# Patient Record
Sex: FEMALE | Race: WHITE
[De-identification: names, ages, dates, MRNs, and addresses within clinical notes are randomized per-mention and may not be internally consistent; named-entity substitution may affect disease eponyms.]

---

## 2018-03-13 ENCOUNTER — HOSPITAL ENCOUNTER (OUTPATIENT)
Dept: HOSPITAL 77 - KA.SDS | Age: 68
Discharge: HOME | End: 2018-03-13
Attending: OPHTHALMOLOGY
Payer: MEDICARE

## 2018-03-13 DIAGNOSIS — F41.8: ICD-10-CM

## 2018-03-13 DIAGNOSIS — Z79.899: ICD-10-CM

## 2018-03-13 DIAGNOSIS — E55.9: ICD-10-CM

## 2018-03-13 DIAGNOSIS — Z79.82: ICD-10-CM

## 2018-03-13 DIAGNOSIS — H25.812: Primary | ICD-10-CM

## 2018-03-13 DIAGNOSIS — E03.9: ICD-10-CM

## 2018-03-13 DIAGNOSIS — E78.5: ICD-10-CM

## 2018-03-13 PROCEDURE — 66984 XCAPSL CTRC RMVL W/O ECP: CPT

## 2018-03-13 RX ADMIN — PHENYLEPHRINE HYDROCHLORIDE SCH DROP: 100 SOLUTION/ DROPS OPHTHALMIC at 13:20

## 2018-03-13 RX ADMIN — PHENYLEPHRINE HYDROCHLORIDE SCH DROP: 100 SOLUTION/ DROPS OPHTHALMIC at 13:05

## 2018-03-13 RX ADMIN — PHENYLEPHRINE HYDROCHLORIDE SCH DROP: 100 SOLUTION/ DROPS OPHTHALMIC at 13:36

## 2018-03-13 RX ADMIN — CYCLOPENTOLATE HYDROCHLORIDE SCH DROP: 10 SOLUTION/ DROPS OPHTHALMIC at 13:25

## 2018-03-13 RX ADMIN — CYCLOPENTOLATE HYDROCHLORIDE SCH DROP: 10 SOLUTION/ DROPS OPHTHALMIC at 12:55

## 2018-03-13 RX ADMIN — CYCLOPENTOLATE HYDROCHLORIDE SCH DROP: 10 SOLUTION/ DROPS OPHTHALMIC at 13:10

## 2018-03-13 NOTE — OR
DATE OF SURGERY:  03/13/2018

 

SURGEON:  Uday Alonzo MD

 

PREOPERATIVE DIAGNOSIS:  Cataract, left eye.

 

POSTOPERATIVE DIAGNOSIS:  Cataract, left eye.

 

OPERATION PERFORMED:  Phacoemulsification with posterior chamber lens insertion,

left eye.

 

HISTORY:  The patient has noted increasing blurred vision for left eye

particularly as she tries to read.  She has a vision of 20/50 +2 to the left.

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

related combined cataract is present.

 

FINDINGS:  The patient was taken to the operating room where appropriate

anesthesia, sedation and monitoring were provided.  A retrobulbar block was

given on the left side.  The eye was massaged and was found to be appropriately

soft.  The eye and eyelids were then prepped and draped in the usual sterile

manner.  A lid speculum was placed.  A micro sharp blade was used to enter the

anterior chamber inside the limbus inferior-temporally.  Xylocaine was irrigated

into the eye at this site. Healon was irrigated into the eye through this site.

Then using a 2.85 mm corneal blade an entry was made into the anterior chamber

just inside the limbus temporally.  Healon was again irrigated into the eye.

Then using a cystitome, the anterior capsulorrhexis was created.  The lens

nucleus was hydrodissected using a 27 gauge cannula and balanced salt solution.

The phacoemulsification unit was introduced through the temporal site and the

Jacques spatula through the inferior temporal site.  In so doing, the lens

nucleus was phacoemulsified.  The cortical fragments of the lens were removed

using the irrigation aspiration unit.  The posterior capsule was polished.

Healon was irrigated into the eye.  The posterior chamber lens was inserted and

rotated into position inside the capsular bag.  The Healon was irrigated out of

the eye.  Miostat was irrigated into the eye and the pupil rounded nicely.  A

single interrupted 10-0 Nylon suture was placed through the temporal corneal

incision site.  Balanced salt solution was irrigated into the eye.  The wound

was tested and found to be tight.  Maxitrol ointment was placed into the

patient's left eye.  The eyelids were closed and an eye patch and valiente shield

were placed.  The patient left the operating room in good condition.

 

DD:  03/13/2018 14:35:48

DT:  03/13/2018 20:44:27

Job #:  [id number]

## 2018-04-03 ENCOUNTER — HOSPITAL ENCOUNTER (OUTPATIENT)
Dept: HOSPITAL 77 - KA.SDS | Age: 68
Discharge: HOME | End: 2018-04-03
Attending: OPHTHALMOLOGY
Payer: MEDICARE

## 2018-04-03 DIAGNOSIS — I49.3: ICD-10-CM

## 2018-04-03 DIAGNOSIS — Z79.899: ICD-10-CM

## 2018-04-03 DIAGNOSIS — E03.9: ICD-10-CM

## 2018-04-03 DIAGNOSIS — E53.9: ICD-10-CM

## 2018-04-03 DIAGNOSIS — H26.9: Primary | ICD-10-CM

## 2018-04-03 DIAGNOSIS — Z79.82: ICD-10-CM

## 2018-04-03 DIAGNOSIS — F41.8: ICD-10-CM

## 2018-04-03 DIAGNOSIS — E78.5: ICD-10-CM

## 2018-04-03 PROCEDURE — C1780 LENS, INTRAOCULAR (NEW TECH): HCPCS

## 2018-04-03 RX ADMIN — PHENYLEPHRINE HYDROCHLORIDE SCH DROP: 100 SOLUTION/ DROPS OPHTHALMIC at 10:19

## 2018-04-03 RX ADMIN — PHENYLEPHRINE HYDROCHLORIDE SCH DROP: 100 SOLUTION/ DROPS OPHTHALMIC at 10:03

## 2018-04-03 RX ADMIN — CYCLOPENTOLATE HYDROCHLORIDE SCH DROP: 10 SOLUTION/ DROPS OPHTHALMIC at 10:09

## 2018-04-03 RX ADMIN — CYCLOPENTOLATE HYDROCHLORIDE SCH DROP: 10 SOLUTION/ DROPS OPHTHALMIC at 10:24

## 2018-04-03 RX ADMIN — PHENYLEPHRINE HYDROCHLORIDE SCH DROP: 100 SOLUTION/ DROPS OPHTHALMIC at 10:34

## 2018-04-03 RX ADMIN — CYCLOPENTOLATE HYDROCHLORIDE SCH DROP: 10 SOLUTION/ DROPS OPHTHALMIC at 09:54

## 2018-04-04 NOTE — OR
DATE OF SURGERY:  04/03/2018

 

SURGEON:  Uday Alonzo MD

 

PREOPERATIVE DIAGNOSIS:  Cataract, right eye.

 

POSTOPERATIVE DIAGNOSIS:  Cataract, right eye.

 

OPERATION PERFORMED:  Phacoemulsification with posterior chamber lens insertion,

right eye.

 

HISTORY:  The patient presents at this time with an increasing amount of

difficulty seeing to read.  Her vision in the right eye is 20/50.  The right eye

has a 3+ nuclear sclerosis and 3+ cortical change of the lens.  She has a

combined cataract and a cataract of aging.

 

FINDINGS:  The patient was taken to the operating room where appropriate

anesthesia, sedation and monitoring were provided.  A retrobulbar block was

given on the right side.  The eye was massaged and was found to be appropriately

soft.  The eye and eyelids were then prepped and draped in the usual sterile

manner.  A lid speculum was placed.  A micro sharp blade was used to enter the

anterior chamber inside the limbus superior-temporally.  Xylocaine was irrigated

into the eye at this site. Healon was irrigated into the eye through this site.

Then using a 2.85 mm corneal blade an entry was made into the anterior chamber

just inside the limbus temporally.  Healon was again irrigated into the eye.

Then using a cystitome, the anterior capsulorrhexis was created.  The lens

nucleus was hydrodissected using a 27 gauge cannula and balanced salt solution.

The phacoemulsification unit was introduced through the temporal site and the

Jacques spatula through the superior temporal site.  In so doing, the lens

nucleus was phacoemulsified.  The cortical fragments of the lens were removed

using the irrigation aspiration unit.  The posterior capsule was polished.

Healon was irrigated into the eye.  The posterior chamber lens was inserted and

rotated into position inside the capsular bag.  The Healon was irrigated out of

the eye.  Miostat was irrigated into the eye and the pupil rounded nicely.  Two

interrupted 10-0 Nylon sutures were placed through the temporal corneal incision

site.  Balanced salt solution was irrigated into the eye.  The wound was tested

and found to be tight.  Maxitrol ointment was placed into the patient's right

eye.  The eyelids were closed and an eye patch and valiente shield were placed.  The

patient left the operating room in good condition.

 

DD:  04/03/2018 11:45:35

DT:  04/03/2018 13:49:53

Job #:  [id number]

## 2018-10-08 ENCOUNTER — HOSPITAL ENCOUNTER (OUTPATIENT)
Dept: HOSPITAL 77 - KA.SDS | Age: 68
Discharge: HOME | End: 2018-10-08
Attending: FAMILY MEDICINE
Payer: MEDICARE

## 2018-10-08 DIAGNOSIS — E78.5: ICD-10-CM

## 2018-10-08 DIAGNOSIS — E53.8: ICD-10-CM

## 2018-10-08 DIAGNOSIS — K57.30: ICD-10-CM

## 2018-10-08 DIAGNOSIS — Z12.11: Primary | ICD-10-CM

## 2018-10-08 DIAGNOSIS — E03.9: ICD-10-CM

## 2018-10-08 DIAGNOSIS — E55.9: ICD-10-CM

## 2018-10-08 DIAGNOSIS — Z80.0: ICD-10-CM

## 2018-10-08 DIAGNOSIS — Z79.82: ICD-10-CM

## 2018-10-08 DIAGNOSIS — Z79.899: ICD-10-CM

## 2018-10-08 DIAGNOSIS — F33.1: ICD-10-CM

## 2018-10-08 NOTE — PCM.OPNOTE
- General Post-Op/Procedure Note


Date of Surgery/Procedure: 10/08/18


Operative Procedure(s): Colonoscopy.


Anesthesia Technique: MAC


Primary Surgeon: Jorge Carrillo


Complications: None


Condition: Good


Free Text/Narrative:: 


INFORMED CONSENT:  Patient is here today for elective colonoscopy.  All aspects 

of this procedure have been discussed with the patient.  All possible 

complications also, including possibility of perforation, infection, pain, 

bleeding and unknown complications.  In the event of perforation patient may 

need to have abdominal exploration, colon resection, colostomy and even death 

was discussed.  Anesthetic complications were handled by anesthesia department.

  The patient understands fully well.  Patient did not have any further 

questions for me at the end of my interview.  The patient wishes for me to 

proceed.





PREOPERATIVE DIAGNOSIS/INDICATIONS:  [screening, strong family history of colon 

carcinoma, sister and brother.]





POSTOPERATIVE DIAGNOSIS:  [Normal] except for market diverticulosis of the 

sigmoid colon





INSTRUMENT USED: Olympus videocolonoscope.    





ASA CLASSIFICATION: []      





ANESTHESIA:  Continuous EKG, oximetry and intermittent blood pressure and 

respiratory monitoring were performed throughout the procedure.  IV Versed and 

Fentanyl were administered. 





PROCEDURE PERFORMED:   Colonoscopy


POSITIONS OF PATIENT: Left lateral.


RECTUM:  Normal.


SIGMOID COLON:  Multiple large diverticula were seen. There was one spastic 

segment.No intrinsic lesions. 


DESCENDING COLON:  Normal. 


SPLENIC FLEXURE:  Normal. 


TRANSVERSE COLON: Normal. 


HEPATIC FLEXURE:  Normal. 


ASCENDING COLON: Normal. 


CECUM:  Normal. 


ILEOCECAL VALVE:  Normal. 


BIOPSY:  None. 


TOLERANCE:  Excellent. 


COMPLICATIONS:  None.

## 2019-08-15 NOTE — PCM.DCSUM1
**Discharge Summary





- Hospital Course


Diagnosis: Stroke: No





- Discharge Data


Discharge Date: 08/15/19


Discharge Disposition: Home, Self-Care 01


Condition: Good





- Discharge Plan


*PRESCRIPTION DRUG MONITORING PROGRAM REVIEWED*: No


*COPY OF PRESCRIPTION DRUG MONITORING REPORT IN PATIENT LAUREN: No


Home Medications: 


 Home Meds





Levothyroxine [Synthroid] 100 mcg PO Q48H 06/15/14 [History]


atorvaSTATin [Lipitor] 20 mg PO BEDTIME 06/15/14 [History]


Cyanocobalamin (Vitamin B-12) [Cyanocobalamin Injection] 1,000 mcg IJ 

ASDIRECTED 03/02/18 [History]


Levothyroxine 112 mcg PO Q48H 03/02/18 [History]


Ergocalciferol (Vitamin D2) [Ergocal] 5,000 unit PO DAILY 10/05/18 [History]


Ibuprofen 200 - 600 mg PO Q4H PRN 10/05/18 [History]


Latanoprost 1 drop EYEBOTH BEDTIME 10/05/18 [History]


cycloSPORINE [Restasis] 1 drop EYEBOTH BID 10/05/18 [History]


Citalopram [Citalopram HBr] 20 mg PO DAILY 08/14/19 [History]


Flaxseed Oil [Flaxseed] 1,000 mg PO DAILY 08/14/19 [History]








Referrals: 


Lucy Gonzalez MD [Physician] - 





- Discharge Summary/Plan Comment


DC Time >30 min.: Yes


Discharge Summary/Plan Comment: 


Final diagnosis


IVANA, clinical,


Bradycardia, 1st degree AV block, new onset.


suspect HTN? 


Mixed hyperlipidemia


Obesity


Acquired hypothyroidism, low TSH


Excessive Cerumen, right ear


Depression with anxiety, was on dual SSRI, Zolot DC'd.








History:  Reviewed H&P by admitted provider. 


Patient came to clinic and was admitted by Liliane Carlos NP due to 

dizziness. Patient reported the dizziness started upon awakening that am. 

Attempted to get OOB and had to lay back down d/t dizziness. Eventually got 

back up very slowly and continued to feel dizzy. Described dizziness as an "off 

balance" or disequilibrium. Patient quite adament that her sx were not vertigo 

as she notes she has had this in the past and described it quite different. 

Admitted to nausea, Had no recent illness, no chest pain, SOB, palpitations, or 

headace or "eye twitching". EKG on admission noting marked sinus bradycardia at 

47 bpm with new 1st degree AV block, possible anterior infarct, poor R wave 

progression. EKG (5/2019) which noted sinus bradycardia at 56 bpm, possible 

anterior infarct, poor R wave progression. Patient has had work-up with nuclear 

med stress test (4/2018) which was negative for ischemia. Bedside glucose 124. 

CBC unremarkable except mildly low WBC at 3.7. CMP unremarkable. Troponin <

0.04. Magnesium nomal,  She did have slightly elevated BP on admission 146/

86.she does have hypothyroidism with low normal TSH level, on replacement 

therapy.  Upon admission she was given NS 1 liter IV bolus x 1, then NS at 75 mL

/hr.  she did had a was successfully irrigated prior to discharge. 








Holter Monitor 2018


--Rhythm was predominantly sinus rhythm with frequent VEs.


--Tachycardia was present for 2% of the tracing. 


--Maximum  bpm 


--Bradycardia was present for 34% of the tracing. Minimum heart rate of 37 


--Frequent VEs (10% of tracing) were seen as isolated, couplets, bigeminal 

cycles, trigeminal cycles, quadrigeminal cycles, and 1 run (longest and: 

fastest run of 4 beats at 111 bpm 








Hospital course


Hospital course went well.  The patient's dizziness symptoms completely 

resolved.  nurses did repobradycardia (30/40's) with periods of apneic while 

patient was sleeping.  Spouse and patient confirm loud snoring, gasping, and 

likely sounds of interruptions in breathing.  she had normal orthostatic, 

normal neurological eval and normal nurisng bedside neuro-checks.  Upon 

admission she was given NS 1 liter IV bolus x 1, then NS at 75 mL/hr. Her right 

ear had excessive cerumen buildup and was successfully irrigated prior to 

discharge. 





Neurological exam:  Normal with Normal Gait


Epsworth Sleepiness Scale:  9/24 (borderline abnormal)


Neck Circumference:  14 in





Medication changes/adjustments upon Discharge:


--Zoloft DC'd upon admission


--May need thyroid med adjumtments 





disposition overall plan


Patient will be discharged from the hospital. she will follow-up with Dr. Amie Gallego, [facility].  I discussed with her about possible IVANA and could be 

contributing to arrhythmias/bradycardia and the need for sleep study. 





Orders placed in EPIC 


--ECHO


--Sleep medicine





- General Info


Functional Status: Reports: Pain Controlled, Tolerating Diet, Ambulating, 

Urinating.  Denies: New Symptoms





- Review of Systems


General: Reports: No Symptoms


HEENT: Denies: Headaches, Sinus Congestion, Sore Throat, Visual Changes


Pulmonary: Reports: No Symptoms


Cardiovascular: Reports: No Symptoms


Gastrointestinal: Reports: No Symptoms


Genitourinary: Reports: No Symptoms


Musculoskeletal: Reports: No Symptoms


Skin: Reports: No Symptoms


Neurological: Denies: Confusion, Dizziness, Numbness, Paresthesia, Seizure, 

Syncope, Trouble Speaking, Difficulty Walking, Weakness, Gait Disturbance


Psychiatric: Reports: No Symptoms





- Patient Data


Vitals - Most Recent: 


 Last Vital Signs











Temp  99.3 F   08/15/19 06:20


 


Pulse  60   08/15/19 06:20


 


Resp  18   08/15/19 06:20


 


BP  132/65   08/15/19 06:20


 


Pulse Ox  98   08/15/19 07:55








 





Orthostatic Blood Pressure [     121/67


Standing]                        


Orthostatic Blood Pressure [     113/66


Sitting]                         


Orthostatic Blood Pressure [     101/46


Supine]                          








Weight - Most Recent: 166 lb 12.8 oz


I&O - Last 24 hours: 


 Intake & Output











 08/14/19 08/15/19 08/15/19





 22:59 06:59 14:59


 


Intake Total 350 1165 


 


Output Total 400  


 


Balance -50 1165 











Lab Results - Last 24 hrs: 


 Laboratory Results - last 24 hr











  08/14/19 08/15/19 08/15/19 Range/Units





  14:10 07:15 07:15 


 


WBC   3.51 L   (5.00-10.00)  10^3/uL


 


RBC   4.22   (3.80-5.50)  10^6/uL


 


Hgb   11.7 L   (12.0-16.0)  g/dL


 


Hct   35.9 L   (37.0-47.0)  %


 


MCV   85.1   (82.0-92.0)  fL


 


MCH   27.7   (27.0-31.0)  pg


 


MCHC   32.6   (32.0-36.0)  g/dL


 


RDW   12.9   (11.5-14.5)  %


 


Plt Count   146 L   (150-400)  10^3/uL


 


MPV   10.7 H   (7.4-10.4)  fL


 


Immature Gran % (Auto)   0.0   (0.0-5.0)  %


 


Neut % (Auto)   53.9   (50.0-70.0)  %


 


Lymph % (Auto)   33.0   (20.0-40.0)  %


 


Mono % (Auto)   9.4 H   (2.0-8.0)  %


 


Eos % (Auto)   3.4 H   (1.0-3.0)  %


 


Baso % (Auto)   0.3   (0.0-1.0)  %


 


Immature Gran # (Auto)   0.00   (0.00-0.50)  10^3/uL


 


Neut # (Auto)   1.89 L   (2.50-7.00)  10^3/uL


 


Lymph # (Auto)   1.16   (1.00-4.00)  10^3/uL


 


Mono # (Auto)   0.33   (0.10-0.80)  10^3/uL


 


Eos # (Auto)   0.12   (0.10-0.30)  10^3/uL


 


Baso # (Auto)   0.01   (0.00-0.10)  10^3/uL


 


Sodium    143  (136-145)  mmol/L


 


Potassium    4.0  (3.3-5.3)  mmol/L


 


Chloride    110  ()  mmol/L


 


Carbon Dioxide    27.8  (21.0-32.0)  mmol/L


 


Anion Gap    9.2  (5-15)  mmol/L


 


BUN    12  (6-25)  mg/dL


 


Creatinine    0.71  (0.51-1.17)  mg/dL


 


Est Cr Clr Drug Dosing    62.73  mL/min


 


Estimated GFR (MDRD)    > 60  mL/min


 


Glucose    95 (75 - 99) mg/dL


 


Calcium    9.2  (8.7-10.3)  mg/dL


 


Specimen Type  Urincc    


 


Urine Color  Yellow    (YELLOW)  


 


Urine Appearance  Slightly cloudy H    (CLEAR)  


 


Urine pH  7.0    (5.0-9.0)  


 


Ur Specific Gravity  1.015    (1.005-1.030)  


 


Urine Protein  Negative    (NEGATIVE)  mg/dL


 


Urine Glucose (UA)  Negative    (NEGATIVE)  mg/dL


 


Urine Ketones  Negative    (NEGATIVE)  mg/dL


 


Urine Occult Blood  Trace-intact H    (NEGATIVE)  


 


Urine Nitrite  Negative    (NEGATIVE)  


 


Urine Bilirubin  Negative    (NEGATIVE)  


 


Urine Urobilinogen  0.2    (0.2-1.0)  E.U./dL


 


Ur Leukocyte Esterase  Small H    (NEGATIVE)  


 


Urine RBC  5-10 H    (0-5)  /HPF


 


Urine WBC  5-10 H    (0-5)  /HPF


 


Ur Epithelial Cells  Few    /LPF


 


Urine Bacteria  Few    (NONE TO FEW)  /HPF











Med Orders - Current: 


 Current Medications





Atorvastatin Calcium (Lipitor)  20 mg PO BEDTIME [facility]


   Last Admin: 08/14/19 20:50 Dose:  20 mg


Atropine Sulfate (Atropine 0.1 Mg/Ml)  0 mg IVPUSH ASDIRECTED PRN


   PRN Reason: Heart


Citalopram Hydrobromide (Celexa)  20 mg PO DAILY [facility]


   Last Admin: 08/15/19 09:35 Dose:  20 mg


Cyclosporine (Restasis)  0 each EYEBOTH BID [facility]


   Last Admin: 08/15/19 09:39 Dose:  Not Given


Epinephrine HCl (Epinephrine 1:10,000)  1 mg IVPUSH ASDIRECTED PRN


   PRN Reason: Heart


Sodium Chloride (Normal Saline)  1,000 mls @ 75 mls/hr IV ASDIRECTED [facility]


   Last Admin: 08/15/19 04:32 Dose:  75 mls/hr


Latanoprost (Xalatan 0.005% Ophth Soln)  0 ml EYEBOTH BEDTIME [facility]


   Last Admin: 08/14/19 20:50 Dose:  1 drop


Levothyroxine Sodium (Levothyroxine)  112 mcg PO Q48H [facility]


   Last Admin: 08/15/19 07:13 Dose:  112 mcg


Levothyroxine Sodium (Synthroid)  100 mcg PO Q48H [facility]


Lidocaine HCl (Xylocaine 2%)  0 mg IVPUSH ASDIRECTED PRN


   PRN Reason: Heart


Meclizine HCl (Antivert)  25 mg PO TID PRN


   PRN Reason: Dizziness


Nitroglycerin (Nitrostat)  0.4 mg SL ASDIRECTED PRN


   PRN Reason: Heart


Sodium Chloride (Saline Flush)  10 ml FLUSH Q8HR PRN


   PRN Reason: keep vein open





Discontinued Medications





Sodium Chloride (Normal Saline)  1,000 mls @ 999 mls/hr IV ONETIME ONE


   Stop: 08/14/19 14:15


   Last Admin: 08/14/19 15:46 Dose:  Not Given


Sodium Chloride (Normal Saline)  1,000 mls @ 999 mls/hr IV ONETIME ONE


   Stop: 08/14/19 14:15


   Last Admin: 08/14/19 14:02 Dose:  999 mls/hr











- Exam


Quality Assessment: Denies: Supplemental Oxygen


General: Reports: Alert, Oriented, Cooperative, No Acute Distress


HEENT: Reports: Pupils Equal, Pupils Reactive, EOMI, Mucous Membr. Moist/Pink, 

Other (negative Romberg, normal gait)


Lungs: Reports: Clear to Auscultation, Normal Respiratory Effort


Cardiovascular: Reports: Regular Rate, Regular Rhythm, No Murmurs, Other (

slilght S2 split)


GI/Abdominal Exam: Normal Bowel Sounds, Soft, Non-Tender, No Organomegaly, No 

Distention, No Abnormal Bruit, No Mass, Pelvis Stable


Neurological: Reports: No New Focal Deficit, Normal Gait, Normal Speech, Normal 

Tone, Strength Equal Bilateral, Sensation Intact, Cranial Nerves Intact


Psy/Mental Status: Reports: Alert, Normal Affect, Normal Mood

## 2020-04-11 NOTE — [id number]
**Discharge Summary





- Hospital Course


Free Text/Narrative:: 


Date of admission:  4/10/2020





Date of discharge: 4/11/2020





Admission diagnoses:


# Presyncope


# Bradycardia





Discharge diagnoses:


# Presyncope


# Bradycardia





Consultations:  None





Procedures:  None





Hospital course:


69-year-old female admitted from the [facility] in [place] for complaints of 

feeling off balance and bradycardia.  She was admitted observation status with 

telemetry monitoring to the [facility].  She has known 

hypothyroidism and takes levothyroxine 100mcg daily, TSH completed on 4/10/2020 

is 0.95.  Troponin 0.06 on admission with repeat of 0.05, CBC and CMP completed 

at [place] and unremarkable other than a mildly elevated chloride of 110.  She 

did not have any overnight symptoms or concerns.  Telemetry showed bradycardia 

for most of the night in the 40s, but was a sinus and she was without any 

symptoms or concerns.  She did volunteer that she does not drink much water.  





Discharge and follow-up recommendations:


- Discharge to home


- New medications at discharge: none


- Follow-up with Dr. Gonzalez next week at the [facility] in [place].   














- Discharge Data


Discharge Date: 04/11/20


Discharge Disposition: Home, Self-Care 01


Condition: Good





- Referral to Home Health


Primary Care Physician: 


Lucy Gonzalez MD








- Patient Instructions


Diet: Usual Diet as Tolerated


Activity: As Tolerated





- Discharge Plan


*PRESCRIPTION DRUG MONITORING PROGRAM REVIEWED*: Not Applicable


*COPY OF PRESCRIPTION DRUG MONITORING REPORT IN PATIENT LAUREN: Not Applicable


Home Medications: 


 Home Meds





Levothyroxine [Synthroid] 100 mcg PO DAILY 06/15/14 [History]


atorvaSTATin [Lipitor] 20 mg PO BEDTIME 06/15/14 [History]


Cyanocobalamin (Vitamin B-12) [Cyanocobalamin Injection] 1,000 mcg INJECT 

ASDIRECTED 03/02/18 [History]


Ibuprofen 200 - 600 mg PO Q4H PRN 10/05/18 [History]


Latanoprost 1 drop EYEBOTH BEDTIME 10/05/18 [History]


Citalopram [Citalopram HBr] 20 mg PO DAILY 08/14/19 [History]


Flaxseed Oil [Flaxseed] 1,000 mg PO DAILY 08/14/19 [History]


Cholecalciferol (Vitamin D3) [Vitamin D3] 1 tab PO DAILY 04/10/20 [History]








Referrals: 


Lucy Gonzalez MD [Primary Care Provider] -  (Follow up on Wednesday 4/15/

2020 or Friday 4/17/2020 in clinic)





- Discharge Summary/Plan Comment


DC Time >30 min.: Yes





- General Info


Subjective Update: 


Patient feeling well.  Sitting up in bed.  No overnight concerns.  In 

discussion her symptoms of light-headedness come from standing too quickly.  No 

other symptoms.  She feels ready for discharge.





Functional Status: Reports: Tolerating Diet.  Denies: New Symptoms





- Review of Systems


General: Denies: Fever, Weakness, Fatigue


Pulmonary: Denies: Shortness of Breath


Cardiovascular: Denies: Chest Pain, Palpitations, Edema


Gastrointestinal: Denies: Abdominal Pain


Neurological: Denies: Dizziness, Syncope, Difficulty Walking





- Patient Data


Vitals - Most Recent: 


 Last Vital Signs











Temp  37.1 C   04/11/20 07:00


 


Pulse  47 L  04/11/20 07:00


 


Resp  16   04/11/20 07:00


 


BP  106/64   04/11/20 07:00


 


Pulse Ox  96   04/11/20 07:00








 





Orthostatic Blood Pressure [     144/83


Standing]                        


Orthostatic Blood Pressure [     145/80


Sitting]                         








Weight - Most Recent: 74.707 kg


I&O - Last 24 hours: 


 Intake & Output











 04/10/20 04/11/20 04/11/20





 22:59 06:59 14:59


 


Intake Total 400 0 


 


Output Total 0 0 


 


Balance 400 0 











Lab Results - Last 24 hrs: 


 Laboratory Results - last 24 hr











  04/10/20 04/10/20 04/10/20 Range/Units





  15:40 15:40 20:33 


 


WBC  5.42    (5.00-10.00)  10^3/uL


 


RBC  5.19    (3.80-5.50)  10^6/uL


 


Hgb  14.1  D    (12.0-16.0)  g/dL


 


Hct  43.5    (37.0-47.0)  %


 


MCV  83.8    (82.0-92.0)  fL


 


MCH  27.2    (27.0-31.0)  pg


 


MCHC  32.4    (32.0-36.0)  g/dL


 


RDW  13.0    (11.5-14.5)  %


 


Plt Count  219    (150-400)  10^3/uL


 


MPV  10.7 H    (7.4-10.4)  fL


 


Immature Gran % (Auto)  0.0    (0.0-5.0)  %


 


Neut % (Auto)  61.4    (50.0-70.0)  %


 


Lymph % (Auto)  26.4    (20.0-40.0)  %


 


Mono % (Auto)  8.5 H    (2.0-8.0)  %


 


Eos % (Auto)  3.1 H    (1.0-3.0)  %


 


Baso % (Auto)  0.6    (0.0-1.0)  %


 


Immature Gran # (Auto)  0.00    (0.00-0.50)  10^3/uL


 


Neut # (Auto)  3.33    (2.50-7.00)  10^3/uL


 


Lymph # (Auto)  1.43    (1.00-4.00)  10^3/uL


 


Mono # (Auto)  0.46    (0.10-0.80)  10^3/uL


 


Eos # (Auto)  0.17    (0.10-0.30)  10^3/uL


 


Baso # (Auto)  0.03    (0.00-0.10)  10^3/uL


 


Sodium   144   (136-145)  mmol/L


 


Potassium   3.9   (3.3-5.3)  mmol/L


 


Chloride   106   ()  mmol/L


 


Carbon Dioxide   26.1   (21.0-32.0)  mmol/L


 


Anion Gap   15.8 H   (5-15)  mmol/L


 


BUN   14   (6-25)  mg/dL


 


Creatinine   0.84   (0.51-1.17)  mg/dL


 


Est Cr Clr Drug Dosing   TNP   


 


Estimated GFR (MDRD)   > 60   mL/min


 


Glucose   99  (75 - 99) mg/dL


 


Calcium   9.7   (8.7-10.3)  mg/dL


 


Total Bilirubin   0.7   (0.2-1.0)  mg/dL


 


AST   22   (15-37)  U/L


 


ALT   33   (12-78)  U/L


 


Alkaline Phosphatase   79   ()  IU/L


 


Troponin I    0.05  (0.00-0.070)  ng/mL


 


Total Protein   7.8   (6.4-8.2)  g/dL


 


Albumin   4.55   (3.00-4.80)  g/dL











Med Orders - Current: 


 Current Medications





Atropine Sulfate (Atropine 0.1 Mg/Ml)  0 mg IVPUSH ASDIRECTED PRN


   PRN Reason: Heart.


Cholecalciferol (Vitamin D3)  125 mcg PO DAILY [facility]


   Last Admin: 04/11/20 08:42 Dose:  Not Given


Citalopram Hydrobromide (Celexa)  20 mg PO 2100 [facility]


   Last Admin: 04/10/20 20:27 Dose:  20 mg


Epinephrine HCl (Epinephrine 1:10,000)  1 mg IVPUSH ASDIRECTED PRN


   PRN Reason: Heart.


Latanoprost (Xalatan 0.005% Ophth Soln)  0 ml EYEBOTH BEDTIME [facility]


   Last Admin: 04/10/20 20:29 Dose:  1 drop


Levothyroxine Sodium (Synthroid)  100 mcg PO 0730 [facility]


   Last Admin: 04/11/20 07:40 Dose:  100 mcg


Lidocaine HCl (Xylocaine 2%)  0 mg IVPUSH ASDIRECTED PRN


   PRN Reason: Heart.


Nitroglycerin (Nitrostat)  0.4 mg SL ASDIRECTED PRN


   PRN Reason: Heart.


Patient's Own Medication** Atorvastatin 20 Mg**  1 each PO BEDTIME [facility]


   Last Admin: 04/10/20 20:28 Dose:  1 each


Sodium Chloride (Saline Flush)  10 ml FLUSH Q8HR PRN


   PRN Reason: keep vein open





Discontinued Medications





Atorvastatin Calcium (Lipitor)  20 mg PO BEDTIME [facility]


Atorvastatin Calcium (Lipitor)  20 mg PO BEDTIME [facility]


Cholecalciferol (Vitamin D3)  125 mcg PO DAILY [facility]


Citalopram Hydrobromide (Celexa)  20 mg PO DAILY [facility]


Latanoprost (Xalatan 0.005% Ophth Soln)  0 ml EYEBOTH BEDTIME [facility]


Latanoprost (Xalatan 0.005% Ophth Soln)  0 ml EYEBOTH BEDTIME [facility]


Levothyroxine Sodium (Synthroid)  100 mcg PO DAILY [facility]











- Exam


Physical Findings Comments:: 


GENERAL: Well-appearing female, sitting up in bed, in no acute distress.


HEENT: Normocephalic, atraumatic.  Conjunctiva clear.  Nares patent without 

discharge.  Mucous membranes moist, posterior pharynx unremarkable.


NECK: Supple, no masses.


CV: Regular rate and rhythm, no murmurs, rubs, or gallops.  2+ radial pulses.


PULMONARY: Normal effort, clear to auscultation bilaterally, no wheezes, rales, 

or rhonchi.


ABDOMEN: Positive bowel sounds, soft, nontender, nondistended.


EXTREMITIES: No edema, cyanosis, or clubbing.


MUSCULOSKELETAL: Moves all extremities well.


NEUROLOGICAL: No obvious deficits.


DERMATOLOGIC: No rashes or suspicious lesions in exposed areas.


PSYCHIATRIC: Alert, interactive, appropriate affect.